# Patient Record
Sex: MALE | Race: WHITE | NOT HISPANIC OR LATINO | Employment: FULL TIME | URBAN - METROPOLITAN AREA
[De-identification: names, ages, dates, MRNs, and addresses within clinical notes are randomized per-mention and may not be internally consistent; named-entity substitution may affect disease eponyms.]

---

## 2017-01-20 ENCOUNTER — OFFICE VISIT (OUTPATIENT)
Dept: FAMILY MEDICINE CLINIC | Facility: CLINIC | Age: 37
End: 2017-01-20

## 2017-01-20 VITALS
OXYGEN SATURATION: 97 % | BODY MASS INDEX: 26.48 KG/M2 | HEIGHT: 70 IN | DIASTOLIC BLOOD PRESSURE: 82 MMHG | WEIGHT: 185 LBS | HEART RATE: 64 BPM | SYSTOLIC BLOOD PRESSURE: 120 MMHG

## 2017-01-20 DIAGNOSIS — K21.9 GASTROESOPHAGEAL REFLUX DISEASE WITHOUT ESOPHAGITIS: Primary | ICD-10-CM

## 2017-01-20 DIAGNOSIS — R07.9 CHEST PAIN OF UNCERTAIN ETIOLOGY: ICD-10-CM

## 2017-01-20 PROCEDURE — 99203 OFFICE O/P NEW LOW 30 MIN: CPT | Performed by: PHYSICIAN ASSISTANT

## 2017-01-20 PROCEDURE — 93000 ELECTROCARDIOGRAM COMPLETE: CPT | Performed by: PHYSICIAN ASSISTANT

## 2017-01-20 RX ORDER — OMEPRAZOLE 20 MG/1
20 CAPSULE, DELAYED RELEASE ORAL DAILY
Qty: 30 CAPSULE | Refills: 11 | Status: SHIPPED | OUTPATIENT
Start: 2017-01-20 | End: 2017-03-14

## 2017-01-20 NOTE — MR AVS SNAPSHOT
Jose Frankel   1/20/2017 1:30 PM   Office Visit    Dept Phone:  [phone]   Encounter #:  [id number]    Provider:  URI Bonner   Department:  [facility]                Your Full Care Plan              Today's Medication Changes          These changes are accurate as of: 1/20/17  2:18 PM.  If you have any questions, ask your nurse or doctor.               New Medication(s)Ordered:     omeprazole 20 MG capsule   Commonly known as:  PRILOSEC   Take 1 capsule by mouth Daily.   Started by:  URI Bonner            Where to Get Your Medications      These medications were sent to [facility]/pharmacy #[id number] - [place], [place] - [address] - [phone]  - [phone]   [address] [facility] [address]    Hours:  24-hours Phone:  [phone]     omeprazole 20 MG capsule                  Your Updated Medication List          This list is accurate as of: 1/20/17  2:18 PM.  Always use your most recent med list.                omeprazole 20 MG capsule   Commonly known as:  PRILOSEC   Take 1 capsule by mouth Daily.               We Performed the Following     Ambulatory referral for Screening EGD       You Were Diagnosed With        Codes Comments    Gastroesophageal reflux disease without esophagitis    -  Primary ICD-10-CM: K21.9  ICD-9-CM: 530.81     Chest pain of uncertain etiology     ICD-10-CM: R07.89  ICD-9-CM: 786.59       Instructions     None    Patient Instructions History      Upcoming Appointments     Visit Type Date Time Department    NEW PATIENT 1/20/2017  1:30 PM [facility]      [employer] Signup     [employer] App allows you to send messages to your doctor, view your test results, renew your prescriptions, schedule appointments, and more. To sign up, go to [employer] and click on the Sign Up Now link in the New User? box. Enter your [employer] Activation Code exactly as it appears below along with  "the last four digits of your Social Security Number and your Date of Birth () to complete the sign-up process. If you do not sign up before the expiration date, you must request a new code.    [employer] Activation Code: [email or web address]  Expires: 2/3/2017  2:18 PM    If you have questions, you can email [email or web address] or call [phone] to talk to our [employer] staff. Remember, [employer] is NOT to be used for urgent needs. For medical emergencies, dial 911.               Other Info from Your Visit           Allergies     No Known Allergies      Reason for Visit     Establish Care     Rapid Heart Rate     Shortness of Breath           Vital Signs     Blood Pressure Pulse Height Weight Oxygen Saturation Body Mass Index    120/82 (BP Location: Right arm, Patient Position: Sitting) 64 70\" (177.8 cm) 185 lb (83.9 kg) 97% 26.54 kg/m2    Smoking Status                   Current Some Day Smoker           Problems and Diagnoses Noted     Acid reflux disease    -  Primary    Chest pain of uncertain etiology            "

## 2017-01-20 NOTE — PROGRESS NOTES
Subjective   Jose Frankel is a 36 y.o. male    History of Present Illness  Patient is a new patient, he did 36-year-old white male comes in complaining of chest pain which is been nonspecific on and off the last year, he was seen last year in [facility] had stress test cardiac workup which was negative, he was given nitroglycerin and told to follow-up as needed.  He states he continued to have rapid heart rate fullness in chest episodes.  He has these episodes at least 3-4 times per week.    He complains of heartburn symptoms on and off for the last year, he has reflux, acid taste in mouth he has recurrent sore throat he has fullness in chest, bloating, he states he takes Tums on daily basis.  He feels bloated, pain with swallowing, worse after heavy meals.  He does have some nausea on occasion.      The following portions of the patient's history were reviewed and updated as appropriate: allergies, current medications, past social history and problem list    Review of Systems   Constitutional: Negative for appetite change, diaphoresis, fatigue and unexpected weight change.   Eyes: Negative for visual disturbance.   Respiratory: Negative for cough and chest tightness.    Cardiovascular: Negative for palpitations.   Gastrointestinal: Negative for diarrhea and nausea.   Endocrine: Negative for polydipsia, polyphagia and polyuria.   Skin: Negative for color change.   Neurological: Negative for dizziness, syncope, weakness, light-headedness and numbness.       Objective     Vitals:    01/20/17 1343   BP: 120/82   Pulse: 64   SpO2: 97%       Physical Exam   Constitutional: He appears well-developed and well-nourished.   Neck: Neck supple. No JVD present. No thyromegaly present.   Cardiovascular: Normal rate, regular rhythm, normal heart sounds, intact distal pulses and normal pulses.    No murmur heard.  Pulmonary/Chest: Effort normal and breath sounds normal. No respiratory distress.   Abdominal: Soft. Bowel  sounds are normal. There is no hepatosplenomegaly. There is no tenderness.   Musculoskeletal: He exhibits no edema.   Lymphadenopathy:     He has no cervical adenopathy.   Neurological: No sensory deficit.   Skin: Skin is warm and dry. He is not diaphoretic.   Nursing note and vitals reviewed.    ECG 12 Lead  Date/Time: 1/20/2017 2:20 PM  Performed by: LIYAH CAAL  Authorized by: LIYAH CAAL   Rhythm: sinus rhythm and sinus bradycardia  Rate: normal  ST Segments: ST segments normal  T Waves: T waves normal  QRS axis: normal  Other: no other findings  Clinical impression: normal ECG  Comments: Normal sinus rhythm            Assessment/Plan     Diagnoses and all orders for this visit:    Gastroesophageal reflux disease without esophagitis  -     omeprazole (PRILOSEC) 20 MG capsule; Take 1 capsule by mouth Daily.  -     Ambulatory referral for Screening EGD    Chest pain of uncertain etiology     #1 Prilosec 20 mg 1 by mouth everyday ×8 weeks    Set up for EGD    #2 chest pain does seem to be gastric in nature, he's had a full cardiac workup in the past he is to follow-up in 2 weeks to see if symptoms improve on GI treatment.    Discussed with patient about quitting smoking, he's not interested at this time.

## 2017-02-02 ENCOUNTER — OUTSIDE FACILITY SERVICE (OUTPATIENT)
Dept: GASTROENTEROLOGY | Facility: CLINIC | Age: 37
End: 2017-02-02

## 2017-02-02 ENCOUNTER — LAB REQUISITION (OUTPATIENT)
Dept: LAB | Facility: HOSPITAL | Age: 37
End: 2017-02-02

## 2017-02-02 DIAGNOSIS — K29.70 GASTRITIS WITHOUT BLEEDING: ICD-10-CM

## 2017-02-02 PROCEDURE — 43239 EGD BIOPSY SINGLE/MULTIPLE: CPT | Performed by: INTERNAL MEDICINE

## 2017-02-02 PROCEDURE — 88305 TISSUE EXAM BY PATHOLOGIST: CPT | Performed by: INTERNAL MEDICINE

## 2017-02-03 LAB
CYTO UR: NORMAL
LAB AP CASE REPORT: NORMAL
LAB AP CLINICAL INFORMATION: NORMAL
Lab: NORMAL
PATH REPORT.FINAL DX SPEC: NORMAL
PATH REPORT.GROSS SPEC: NORMAL

## 2017-03-14 ENCOUNTER — OFFICE VISIT (OUTPATIENT)
Dept: FAMILY MEDICINE CLINIC | Facility: CLINIC | Age: 37
End: 2017-03-14

## 2017-03-14 VITALS
HEART RATE: 74 BPM | SYSTOLIC BLOOD PRESSURE: 110 MMHG | HEIGHT: 70 IN | BODY MASS INDEX: 27.77 KG/M2 | OXYGEN SATURATION: 97 % | DIASTOLIC BLOOD PRESSURE: 70 MMHG | TEMPERATURE: 98.9 F | WEIGHT: 194 LBS

## 2017-03-14 DIAGNOSIS — K21.00 GASTROESOPHAGEAL REFLUX DISEASE WITH ESOPHAGITIS: Primary | ICD-10-CM

## 2017-03-14 DIAGNOSIS — Z72.0 TOBACCO ABUSE: ICD-10-CM

## 2017-03-14 PROCEDURE — 99214 OFFICE O/P EST MOD 30 MIN: CPT | Performed by: PHYSICIAN ASSISTANT

## 2017-03-14 PROCEDURE — 99406 BEHAV CHNG SMOKING 3-10 MIN: CPT | Performed by: PHYSICIAN ASSISTANT

## 2017-03-14 RX ORDER — PANTOPRAZOLE SODIUM 40 MG/1
40 TABLET, DELAYED RELEASE ORAL DAILY
Qty: 30 TABLET | Refills: 11 | Status: SHIPPED | OUTPATIENT
Start: 2017-03-14

## 2017-03-14 RX ORDER — VARENICLINE TARTRATE 1 MG/1
1 TABLET, FILM COATED ORAL 2 TIMES DAILY
Qty: 60 TABLET | Refills: 11 | Status: SHIPPED | OUTPATIENT
Start: 2017-03-14

## 2017-03-14 RX ORDER — VARENICLINE TARTRATE 0.5 MG/1
0.5 TABLET, FILM COATED ORAL 2 TIMES DAILY
Qty: 60 TABLET | Refills: 0 | Status: SHIPPED | OUTPATIENT
Start: 2017-03-14

## 2017-03-14 RX ORDER — RANITIDINE 150 MG/1
150 TABLET ORAL DAILY
Qty: 30 TABLET | Refills: 0 | Status: SHIPPED | OUTPATIENT
Start: 2017-03-14

## 2017-03-14 NOTE — PROGRESS NOTES
Subjective   Jose Frankel is a 36 y.o. male    Heartburn   He complains of abdominal pain, belching, choking, heartburn, a hoarse voice, nausea, a sore throat and wheezing. He reports no chest pain or no coughing. This is a recurrent problem. The current episode started more than 1 month ago. The problem occurs constantly. The problem has been gradually worsening. The heartburn duration is several minutes. The heartburn is located in the abdomen. The heartburn is of moderate intensity. The heartburn wakes him from sleep. The heartburn does not limit his activity. The heartburn changes with position. The symptoms are aggravated by lying down. Associated symptoms include fatigue. Patient had EGD which showed gastritis and negative H. pylori in January, patient had an taking Prilosec at first he had some improvement symptoms are starting to get worse again.  Patient midepigastric pain radiating the back, cramping lower abdominal pain nausea after eating, heartburn symptoms radiating up into throat acid taste in mouth, symptoms are not controlled.. Risk factors include smoking/tobacco exposure. Treatments tried: Prilosec. The treatment provided mild relief. Past procedures include an EGD. Patient had EGD which showed gastritis with negative H. pylori in January.   Nicotine Dependence   Presents for initial visit. Symptoms include cravings, fatigue, headache, irritability and sore throat. Symptoms are negative for insomnia. Preferred tobacco types include cigarettes. Preferred cigarette types include filtered. Preferred strength is light and ultra light. Preferred brands include [place]. His urge triggers include company of smokers, drinking coffee, driving and stress. His first smoke is before 6 AM. He smokes 1 pack of cigarettes per day. He started smoking when he was 15-17 years old. Past treatments include nothing. The treatment provided no relief. Jose is ready to quit.       The following portions of the  patient's history were reviewed and updated as appropriate: allergies, current medications, past social history and problem list    Review of Systems   Constitutional: Positive for fatigue and irritability. Negative for appetite change, chills, fever and unexpected weight change.   HENT: Positive for hoarse voice and sore throat.    Respiratory: Positive for choking and wheezing. Negative for cough, chest tightness and shortness of breath.    Cardiovascular: Negative for chest pain, palpitations and leg swelling.   Gastrointestinal: Positive for abdominal pain, diarrhea, heartburn and nausea. Negative for blood in stool, constipation and vomiting.        Heartburn   Genitourinary: Negative for difficulty urinating and dysuria.   Musculoskeletal: Negative for back pain.   Skin: Negative for color change and rash.   Allergic/Immunologic: Negative for food allergies.   Neurological: Negative for dizziness, syncope, weakness and headaches.   Psychiatric/Behavioral: The patient does not have insomnia.        Objective     Vitals:    03/14/17 1015   BP: 110/70   Pulse: 74   Temp: 98.9 °F (37.2 °C)   SpO2: 97%       Physical Exam   Constitutional: He appears well-developed and well-nourished.   HENT:   Head: Normocephalic and atraumatic.   Nose: Nose normal.   Mouth/Throat: Oropharynx is clear and moist.   Neck: Neck supple. No JVD present. No thyromegaly present.   Cardiovascular: Normal rate, regular rhythm, normal heart sounds, intact distal pulses and normal pulses.    No murmur heard.  Pulmonary/Chest: Effort normal and breath sounds normal. No stridor. No respiratory distress.   Abdominal: Soft. Bowel sounds are normal. There is no hepatosplenomegaly. There is tenderness.   Musculoskeletal: He exhibits no edema.   Lymphadenopathy:     He has no cervical adenopathy.   Neurological: No sensory deficit.   Skin: Skin is warm and dry. He is not diaphoretic.   Nursing note and vitals reviewed.      Assessment/Plan  "    Diagnoses and all orders for this visit:    Gastroesophageal reflux disease with esophagitis  -     pantoprazole (PROTONIX) 40 MG EC tablet; Take 1 tablet by mouth Daily. Stop Omperazole not working  -     raNITIdine (ZANTAC) 150 MG tablet; Take 1 tablet by mouth Daily. Take at noon for one month    Tobacco abuse  -     varenicline (CHANTIX) 0.5 MG tablet; Take 1 tablet by mouth 2 (Two) Times a Day.  -     varenicline (CHANTIX CONTINUING MONTH EMMANUEL) 1 MG tablet; Take 1 tablet by mouth 2 (Two) Times a Day.    #1 DC Prilosec, not controlling symptoms    Start Protonix 40 mg 1 by mouth everyday in the a.m. Dispense 30 with 11 refills    Add Zantac 150 mg 1 by mouth at noon dispense 30  discussed  diet and exercise with patient,smaller meals, low-fat diet, discussed importance of no late night eating  Encouraged patient to lose 5 pounds.    #2 time spent with patient 10: 10 to 10:30 AM, 4 minutes that time was used with patient discussed importance of stopping smoking and counseling patient had quit smoking.  Counseled patient on the dangers of smoking such as heart disease, lung disease, peripheral vascular disease.  We have decided to use Chantix prescriptions were written, side effects of medication discussed.  Pamphlet given\"talking quitting\" discussed group counseling program, he's return in one month monitor progress.  Went over the depression scale with patient and advised patient while trying to quit smoking this will be difficulty and if he started having feelings of depression and anxiety he needed to notify our office    "